# Patient Record
Sex: MALE
[De-identification: names, ages, dates, MRNs, and addresses within clinical notes are randomized per-mention and may not be internally consistent; named-entity substitution may affect disease eponyms.]

---

## 2020-03-25 PROBLEM — Z00.00 ENCOUNTER FOR PREVENTIVE HEALTH EXAMINATION: Status: ACTIVE | Noted: 2020-03-25

## 2020-03-27 ENCOUNTER — APPOINTMENT (OUTPATIENT)
Dept: HEART AND VASCULAR | Facility: CLINIC | Age: 50
End: 2020-03-27
Payer: COMMERCIAL

## 2020-03-27 ENCOUNTER — NON-APPOINTMENT (OUTPATIENT)
Age: 50
End: 2020-03-27

## 2020-03-27 VITALS
BODY MASS INDEX: 28.23 KG/M2 | SYSTOLIC BLOOD PRESSURE: 144 MMHG | WEIGHT: 213 LBS | HEIGHT: 73 IN | TEMPERATURE: 98.2 F | DIASTOLIC BLOOD PRESSURE: 88 MMHG | HEART RATE: 88 BPM | OXYGEN SATURATION: 100 %

## 2020-03-27 DIAGNOSIS — Z78.9 OTHER SPECIFIED HEALTH STATUS: ICD-10-CM

## 2020-03-27 DIAGNOSIS — Z82.49 FAMILY HISTORY OF ISCHEMIC HEART DISEASE AND OTHER DISEASES OF THE CIRCULATORY SYSTEM: ICD-10-CM

## 2020-03-27 PROCEDURE — 99204 OFFICE O/P NEW MOD 45 MIN: CPT

## 2020-03-27 PROCEDURE — 93000 ELECTROCARDIOGRAM COMPLETE: CPT

## 2020-03-27 NOTE — REASON FOR VISIT
[Initial Evaluation] : an initial evaluation of [FreeTextEntry1] : Pleasant 50 year old  presents after a visit to ER x 2. The first episode was in the setting of passing out at home. It seems that he was using the restroom at the time. He had a cardiac work up that included an echocardiogram and a cardiac cta. The studies were unremarkable. After that he, noted a presence of an unusual sensation in the back of his chest. He remarks on it feeling like his heart is beating in a way that is unfamiliar or different. No dizziness associated with it. However, he remarks on feeling uneasy.

## 2020-03-27 NOTE — DISCUSSION/SUMMARY
[FreeTextEntry1] : Palpitations, abn ekg. would like to get a seven day event monitor and inc metoprolol. WIll also order a stress echocardiogram to evaluate effectiveness of this intervention provided his insurance company feels that it is a reasonable course of action and deems appropriate to approve.\par HTN Mic BRADEN  and I had an extensive discussion regarding his blood pressure management. Patient will continue taking current medications in addition to maintaining a low Na diet, with periodic b/p checks at home. Will reduce HCTZ to 25 mg as we are inc metoprolol\par

## 2020-03-30 RX ORDER — LOSARTAN POTASSIUM 100 MG/1
100 TABLET, FILM COATED ORAL DAILY
Qty: 90 | Refills: 3 | Status: ACTIVE | COMMUNITY
Start: 1900-01-01 | End: 1900-01-01

## 2020-04-07 ENCOUNTER — APPOINTMENT (OUTPATIENT)
Dept: HEART AND VASCULAR | Facility: CLINIC | Age: 50
End: 2020-04-07
Payer: COMMERCIAL

## 2020-04-07 VITALS
HEIGHT: 73 IN | BODY MASS INDEX: 28.23 KG/M2 | TEMPERATURE: 98.7 F | DIASTOLIC BLOOD PRESSURE: 101 MMHG | HEART RATE: 100 BPM | SYSTOLIC BLOOD PRESSURE: 165 MMHG | OXYGEN SATURATION: 99 % | WEIGHT: 213 LBS

## 2020-04-07 PROCEDURE — 99214 OFFICE O/P EST MOD 30 MIN: CPT

## 2020-04-07 PROCEDURE — 93351 STRESS TTE COMPLETE: CPT

## 2020-04-07 NOTE — DISCUSSION/SUMMARY
[FreeTextEntry1] : Syncope holter pending\par HTN - ASIYA  and I had an extensive discussion regarding his blood pressure management. Patient will continue taking current medications in addition to maintaining a low Na diet, with periodic b/p checks at home.\par HLD ASIYA and I discussed his lipid panel and individualized target LDL goal. At this point, will do diet and exercise with anticipation of re-evaluating labs in 3-6 months\par

## 2020-04-07 NOTE — PHYSICAL EXAM
[General Appearance - Well Developed] : well developed [Normal Appearance] : normal appearance [Well Groomed] : well groomed [General Appearance - Well Nourished] : well nourished [No Deformities] : no deformities [General Appearance - In No Acute Distress] : no acute distress [Normal Conjunctiva] : the conjunctiva exhibited no abnormalities [Eyelids - No Xanthelasma] : the eyelids demonstrated no xanthelasmas [Normal Oral Mucosa] : normal oral mucosa [No Oral Pallor] : no oral pallor [No Oral Cyanosis] : no oral cyanosis [Normal Jugular Venous A Waves Present] : normal jugular venous A waves present [Normal Jugular Venous V Waves Present] : normal jugular venous V waves present [No Jugular Venous Diaz A Waves] : no jugular venous diaz A waves [Respiration, Rhythm And Depth] : normal respiratory rhythm and effort [Exaggerated Use Of Accessory Muscles For Inspiration] : no accessory muscle use [Auscultation Breath Sounds / Voice Sounds] : lungs were clear to auscultation bilaterally [Heart Rate And Rhythm] : heart rate and rhythm were normal [Heart Sounds] : normal S1 and S2 [Murmurs] : no murmurs present [Abdomen Soft] : soft [Abdomen Tenderness] : non-tender [Abdomen Mass (___ Cm)] : no abdominal mass palpated [Abnormal Walk] : normal gait [Gait - Sufficient For Exercise Testing] : the gait was sufficient for exercise testing [Nail Clubbing] : no clubbing of the fingernails [Cyanosis, Localized] : no localized cyanosis [Petechial Hemorrhages (___cm)] : no petechial hemorrhages [Skin Color & Pigmentation] : normal skin color and pigmentation [] : no rash [No Venous Stasis] : no venous stasis [Skin Lesions] : no skin lesions [No Skin Ulcers] : no skin ulcer [No Xanthoma] : no  xanthoma was observed [Oriented To Time, Place, And Person] : oriented to person, place, and time [Affect] : the affect was normal [Mood] : the mood was normal [No Anxiety] : not feeling anxious

## 2020-04-07 NOTE — REASON FOR VISIT
[Hypertension] : hypertension [FreeTextEntry1] : Pleasant 50 year old  presents after a visit to ER x 2. The first episode was in the setting of passing out at home. It seems that he was using the restroom at the time. He had a cardiac work up that included an echocardiogram and a cardiac cta. The studies were unremarkable. After that he, noted a presence of an unusual sensation in the back of his chest. He remarks on it feeling like his heart is beating in a way that is unfamiliar or different. No dizziness associated with it. However, he remarks on feeling uneasy.

## 2020-04-10 ENCOUNTER — APPOINTMENT (OUTPATIENT)
Dept: HEART AND VASCULAR | Facility: CLINIC | Age: 50
End: 2020-04-10
Payer: COMMERCIAL

## 2020-04-10 PROCEDURE — 99214 OFFICE O/P EST MOD 30 MIN: CPT

## 2020-04-10 NOTE — REASON FOR VISIT
[Follow-Up - Clinic] : a clinic follow-up of [Hypertension] : hypertension [FreeTextEntry1] : This visit was provided via telehealth using real-time 2 way audio visual technology. The patient, ASIYA SANTA, was located at home, [address] [place] [address]\[place] [address] , at the time of the vist. The Doctor, Alber Golden MD, [facility], was located at his medical office located at [address] [place] [address], [address] Floor, [place] [address] at the time of the visit. The patient, ASIYA SANTA and the Doctor participated in telehealth encounter. Verbal concent was given on Apr 10, 2020  by the patient, self.\par \par He is doing reasonably well in terms of blood pressure. Occasional dizziness noted with improvement. \par

## 2020-04-10 NOTE — DISCUSSION/SUMMARY
[FreeTextEntry1] : HTN inclined to cont current medications. he will monitor b/p at home\par dizziness inclined to have him see Neuro as symptoms sound more like post-concussion syndrome\par syncope will holter monitor completed and pending.

## 2020-04-20 RX ORDER — HYDROCHLOROTHIAZIDE 25 MG/1
25 TABLET ORAL DAILY
Qty: 90 | Refills: 3 | Status: ACTIVE | COMMUNITY
Start: 2020-03-27 | End: 1900-01-01

## 2020-04-24 ENCOUNTER — APPOINTMENT (OUTPATIENT)
Dept: HEART AND VASCULAR | Facility: CLINIC | Age: 50
End: 2020-04-24
Payer: COMMERCIAL

## 2020-04-24 PROCEDURE — 99442: CPT

## 2020-05-08 ENCOUNTER — APPOINTMENT (OUTPATIENT)
Dept: HEART AND VASCULAR | Facility: CLINIC | Age: 50
End: 2020-05-08
Payer: COMMERCIAL

## 2020-05-08 ENCOUNTER — TRANSCRIPTION ENCOUNTER (OUTPATIENT)
Age: 50
End: 2020-05-08

## 2020-05-08 DIAGNOSIS — R00.2 PALPITATIONS: ICD-10-CM

## 2020-05-08 DIAGNOSIS — R55 SYNCOPE AND COLLAPSE: ICD-10-CM

## 2020-05-08 DIAGNOSIS — I49.49 OTHER PREMATURE DEPOLARIZATION: ICD-10-CM

## 2020-05-08 DIAGNOSIS — I10 ESSENTIAL (PRIMARY) HYPERTENSION: ICD-10-CM

## 2020-05-08 PROCEDURE — 99214 OFFICE O/P EST MOD 30 MIN: CPT | Mod: 95

## 2020-05-08 RX ORDER — METOPROLOL SUCCINATE 50 MG/1
50 TABLET, EXTENDED RELEASE ORAL
Qty: 180 | Refills: 1 | Status: COMPLETED | COMMUNITY
Start: 2020-03-27 | End: 2020-05-08

## 2020-05-08 NOTE — DISCUSSION/SUMMARY
[FreeTextEntry1] : HTN - ASIYA  and I had an extensive discussion regarding his blood pressure management. Patient will continue taking current medications in addition to maintaining a low Na diet, with periodic b/p checks at home.\par Palps/syncope likely a vagal episode, work up unremarkable so far. Continue current medications. He is of acceptable risk ot go back to work.

## 2020-05-08 NOTE — REASON FOR VISIT
[Follow-Up - Clinic] : a clinic follow-up of [Hypertension] : hypertension [Syncope] : syncope [FreeTextEntry1] : This visit was provided via telehealth using real-time 2 way audio visual technology. The patient, ASIYA SANTA, was located at home, [address] [place] [address] , at the time of the vist. The Doctor, Alber Golden MD, formerly [facility], was located at his medical office located at [address] [place] [address], [address] Floor, [place] [address] at the time of the visit. The patient, ASIYA SANTA and the Doctor participated in telehealth encounter. Verbal consent was given on May 08, 2020  by the patient, self.\par \par Doing better. Stopped metoprolol. However, he continues the other two medications. He has not passed out since the initial episode. Doing well with occasional palpitations.

## 2020-06-17 ENCOUNTER — APPOINTMENT (OUTPATIENT)
Dept: HEART AND VASCULAR | Facility: CLINIC | Age: 50
End: 2020-06-17

## 2020-07-17 ENCOUNTER — APPOINTMENT (OUTPATIENT)
Dept: PULMONOLOGY | Facility: CLINIC | Age: 50
End: 2020-07-17
Payer: COMMERCIAL

## 2020-07-17 VITALS — SYSTOLIC BLOOD PRESSURE: 178 MMHG | DIASTOLIC BLOOD PRESSURE: 113 MMHG

## 2020-07-17 VITALS
BODY MASS INDEX: 27.96 KG/M2 | DIASTOLIC BLOOD PRESSURE: 115 MMHG | HEIGHT: 73 IN | OXYGEN SATURATION: 99 % | SYSTOLIC BLOOD PRESSURE: 178 MMHG | TEMPERATURE: 98.8 F | WEIGHT: 211 LBS | HEART RATE: 91 BPM

## 2020-07-17 DIAGNOSIS — R06.83 SNORING: ICD-10-CM

## 2020-07-17 PROCEDURE — 99203 OFFICE O/P NEW LOW 30 MIN: CPT

## 2020-07-17 NOTE — ASSESSMENT
[FreeTextEntry1] : 49yo with poorly controlled hypertension referred by Dr. Golden for evaluation of TRACEE. The patient has multiple signs and symptoms of sleep-disordered breathing including snoring and apneas. He was referred to the [facility] for a diagnostic HST. The ramifications of TRACEE and its potential therapeutic modalities were discussed with the patient. The patient was cautioned on the importance of avoiding drowsy driving. He will follow up with us after the HST.\par \par Of note, he will be taking his BP meds as soon as he gets home after this appt.\par \par \par \par

## 2020-07-17 NOTE — HISTORY OF PRESENT ILLNESS
[Snoring] : snoring [Witnessed Apneas] : witnessed sleep apnea [Awakes Unrefreshed] : awakening unrefreshed [FreeTextEntry1] : 51yo with HTN, and recent syncopal episode referred for TRACEE evaluation. Snores, and has apneas. Has poorly controlled BP. Did not take his BP meds this AM; BP is high but denies nausea, HAs, vision changes. Weight stable. Never smoker. No more syncopal episodes. Cardiac workup in progress; so far non diagnostic. [Awakes with Headache] : no headache upon awakening [Awakening With Dry Mouth] : no dry mouth upon awakening [Recent  Weight Gain] : no recent weight gain

## 2020-07-17 NOTE — REVIEW OF SYSTEMS
[EDS: ESS=____] : daytime somnolence: ESS=[unfilled] [Negative] : Psychiatric [Lower Extremity Discomfort] : no lower extremity discomfort [Difficulty Initiating Sleep] : no difficulty falling asleep [Late day/ Evening symptoms] : no late day/evening symptoms [Unusual Sleep Behavior] : no unusual sleep behavior [Cataplexy] :  no cataplexy [Hypersomnolence] : not sleeping much more than usual

## 2020-07-17 NOTE — PHYSICAL EXAM
[Normal Appearance] : normal appearance [General Appearance - Well Developed] : well developed [Well Groomed] : well groomed [General Appearance - Well Nourished] : well nourished [No Deformities] : no deformities [General Appearance - In No Acute Distress] : no acute distress [Normal Conjunctiva] : the conjunctiva exhibited no abnormalities [IV] : IV [Normal Oropharynx] : normal oropharynx [Apical Impulse] : the apical impulse was normal [Neck Appearance] : the appearance of the neck was normal [Heart Rate And Rhythm] : heart rate was normal and rhythm regular [] : no respiratory distress [Respiration, Rhythm And Depth] : normal respiratory rhythm and effort [Abnormal Walk] : normal gait [Nail Clubbing] : no clubbing of the fingernails [Cyanosis, Localized] : no localized cyanosis [Oriented To Time, Place, And Person] : oriented to person, place, and time [No Focal Deficits] : no focal deficits [Impaired Insight] : insight and judgment were intact

## 2020-07-17 NOTE — CONSULT LETTER
[Dear  ___] : Dear  [unfilled], [Consult Letter:] : I had the pleasure of evaluating your patient, [unfilled]. [Consult Closing:] : Thank you very much for allowing me to participate in the care of this patient.  If you have any questions, please do not hesitate to contact me. [Please see my note below.] : Please see my note below. [Sincerely,] : Sincerely, [FreeTextEntry3] : Sandie Rosenberg MD\par \par [place] [facility] [employer] [facility]\par Pulmonary, Critical Care, and Sleep Medicine\par

## 2020-07-31 ENCOUNTER — APPOINTMENT (OUTPATIENT)
Dept: SLEEP CENTER | Facility: HOME HEALTH | Age: 50
End: 2020-07-31
Payer: COMMERCIAL

## 2020-07-31 ENCOUNTER — OUTPATIENT (OUTPATIENT)
Dept: OUTPATIENT SERVICES | Facility: HOSPITAL | Age: 50
LOS: 1 days | End: 2020-07-31
Payer: COMMERCIAL

## 2020-07-31 PROCEDURE — 95800 SLP STDY UNATTENDED: CPT

## 2020-07-31 PROCEDURE — 95800 SLP STDY UNATTENDED: CPT | Mod: 26

## 2020-08-03 DIAGNOSIS — G47.33 OBSTRUCTIVE SLEEP APNEA (ADULT) (PEDIATRIC): ICD-10-CM

## 2020-08-31 ENCOUNTER — APPOINTMENT (OUTPATIENT)
Dept: PULMONOLOGY | Facility: CLINIC | Age: 50
End: 2020-08-31
Payer: COMMERCIAL

## 2020-08-31 VITALS
WEIGHT: 210 LBS | SYSTOLIC BLOOD PRESSURE: 157 MMHG | OXYGEN SATURATION: 100 % | DIASTOLIC BLOOD PRESSURE: 104 MMHG | HEART RATE: 105 BPM | HEIGHT: 73 IN | BODY MASS INDEX: 27.83 KG/M2 | TEMPERATURE: 98.8 F

## 2020-08-31 DIAGNOSIS — G47.33 OBSTRUCTIVE SLEEP APNEA (ADULT) (PEDIATRIC): ICD-10-CM

## 2020-08-31 PROCEDURE — 99214 OFFICE O/P EST MOD 30 MIN: CPT

## 2020-08-31 RX ORDER — METOPROLOL TARTRATE 75 MG/1
TABLET, FILM COATED ORAL
Refills: 0 | Status: ACTIVE | COMMUNITY

## 2020-08-31 NOTE — HISTORY OF PRESENT ILLNESS
[FreeTextEntry1] : 51yo with HTN, and recent syncopal episode referred for TRACEE evaluation. Snores, and has apneas. Has poorly controlled BP. Did not take his BP meds this AM; BP is high but denies nausea, HAs, vision changes. Weight stable. Never smoker. No more syncopal episodes. Cardiac workup in progress; so far non diagnostic.\par \par 8/31/2020\par Had HST; following up for results.\par BP is high but took AM meds. [Snoring] : snoring [Awakes Unrefreshed] : awakening unrefreshed [Awakes with Headache] : no headache upon awakening [Witnessed Apneas] : witnessed sleep apnea [Awakening With Dry Mouth] : no dry mouth upon awakening [Recent  Weight Gain] : no recent weight gain [Date: ___] : Date of most recent diagnostic polysomnogram: [unfilled] [AHI: ___ per hour] : Apnea-hypopnea index:  [unfilled] per hour [Sam desatuation%: ___] : Sam desaturation:  [unfilled]%

## 2020-08-31 NOTE — REVIEW OF SYSTEMS
[EDS: ESS=____] : daytime somnolence: ESS=[unfilled] [Difficulty Initiating Sleep] : no difficulty falling asleep [Lower Extremity Discomfort] : no lower extremity discomfort [Late day/ Evening symptoms] : no late day/evening symptoms [Unusual Sleep Behavior] : no unusual sleep behavior [Hypersomnolence] : not sleeping much more than usual [Cataplexy] :  no cataplexy [Negative] : Musculoskeletal

## 2020-08-31 NOTE — PHYSICAL EXAM
[General Appearance - Well Developed] : well developed [Normal Appearance] : normal appearance [Well Groomed] : well groomed [General Appearance - Well Nourished] : well nourished [No Deformities] : no deformities [General Appearance - In No Acute Distress] : no acute distress [Normal Conjunctiva] : the conjunctiva exhibited no abnormalities [Normal Oropharynx] : normal oropharynx [IV] : IV [Apical Impulse] : the apical impulse was normal [Heart Rate And Rhythm] : heart rate was normal and rhythm regular [Respiration, Rhythm And Depth] : normal respiratory rhythm and effort [] : no respiratory distress [Abnormal Walk] : normal gait [Cyanosis, Localized] : no localized cyanosis [Nail Clubbing] : no clubbing of the fingernails [No Focal Deficits] : no focal deficits [Oriented To Time, Place, And Person] : oriented to person, place, and time [Impaired Insight] : insight and judgment were intact

## 2020-08-31 NOTE — ASSESSMENT
[FreeTextEntry1] : 51yo with HTN, and recent syncopal episode referred for TRACEE evaluation. Had HST. The patient is symptomatic and according to current practice guidelines treatment for mild TRACEE is only indicated for symptom management. The benefits of TRACEE treatment in improving cardiac, neurologic, cognitive, and mortality outcomes have not been substantiated by research when mild in severity. PAP and mandibular advancement therapy were discussed in detail. Mr. Christian would like to pursue PAP therapy. Equipment ordered; DME is [employer]; will have mask fitting at time of ; APAP set 5 to 20 cm H2O. Adherence goal is at least 4 hours of use per night on 70% of nights. The ramifications of TRACEE and its treatment were discussed in detail. Follow up within 6 weeks of use or sooner if needed.\par